# Patient Record
Sex: FEMALE | Race: WHITE | NOT HISPANIC OR LATINO | URBAN - METROPOLITAN AREA
[De-identification: names, ages, dates, MRNs, and addresses within clinical notes are randomized per-mention and may not be internally consistent; named-entity substitution may affect disease eponyms.]

---

## 2020-02-29 ENCOUNTER — EMERGENCY (EMERGENCY)
Facility: HOSPITAL | Age: 75
LOS: 1 days | Discharge: HOME | End: 2020-02-29
Attending: EMERGENCY MEDICINE | Admitting: EMERGENCY MEDICINE
Payer: MEDICARE

## 2020-02-29 VITALS
RESPIRATION RATE: 16 BRPM | SYSTOLIC BLOOD PRESSURE: 115 MMHG | HEART RATE: 82 BPM | HEIGHT: 64 IN | WEIGHT: 182.1 LBS | DIASTOLIC BLOOD PRESSURE: 74 MMHG | TEMPERATURE: 98 F | OXYGEN SATURATION: 99 %

## 2020-02-29 VITALS
TEMPERATURE: 98 F | SYSTOLIC BLOOD PRESSURE: 102 MMHG | OXYGEN SATURATION: 99 % | DIASTOLIC BLOOD PRESSURE: 59 MMHG | RESPIRATION RATE: 16 BRPM | HEART RATE: 57 BPM

## 2020-02-29 LAB
ALBUMIN SERPL ELPH-MCNC: 3.7 G/DL — SIGNIFICANT CHANGE UP (ref 3.3–5)
ALP SERPL-CCNC: 73 U/L — SIGNIFICANT CHANGE UP (ref 40–120)
ALT FLD-CCNC: 37 U/L — SIGNIFICANT CHANGE UP (ref 12–78)
ANION GAP SERPL CALC-SCNC: 9 MMOL/L — SIGNIFICANT CHANGE UP (ref 5–17)
AST SERPL-CCNC: 64 U/L — HIGH (ref 15–37)
BASOPHILS # BLD AUTO: 0.02 K/UL — SIGNIFICANT CHANGE UP (ref 0–0.2)
BASOPHILS NFR BLD AUTO: 0.3 % — SIGNIFICANT CHANGE UP (ref 0–2)
BILIRUB SERPL-MCNC: 0.8 MG/DL — SIGNIFICANT CHANGE UP (ref 0.2–1.2)
BUN SERPL-MCNC: 19 MG/DL — SIGNIFICANT CHANGE UP (ref 7–23)
CALCIUM SERPL-MCNC: 9.8 MG/DL — SIGNIFICANT CHANGE UP (ref 8.5–10.1)
CHLORIDE SERPL-SCNC: 103 MMOL/L — SIGNIFICANT CHANGE UP (ref 96–108)
CO2 SERPL-SCNC: 27 MMOL/L — SIGNIFICANT CHANGE UP (ref 22–31)
CREAT SERPL-MCNC: 0.7 MG/DL — SIGNIFICANT CHANGE UP (ref 0.5–1.3)
EOSINOPHIL # BLD AUTO: 0.2 K/UL — SIGNIFICANT CHANGE UP (ref 0–0.5)
EOSINOPHIL NFR BLD AUTO: 3 % — SIGNIFICANT CHANGE UP (ref 0–6)
GLUCOSE SERPL-MCNC: 97 MG/DL — SIGNIFICANT CHANGE UP (ref 70–99)
HCT VFR BLD CALC: 37 % — SIGNIFICANT CHANGE UP (ref 34.5–45)
HGB BLD-MCNC: 12.5 G/DL — SIGNIFICANT CHANGE UP (ref 11.5–15.5)
IMM GRANULOCYTES NFR BLD AUTO: 0.5 % — SIGNIFICANT CHANGE UP (ref 0–1.5)
LIDOCAIN IGE QN: 165 U/L — SIGNIFICANT CHANGE UP (ref 73–393)
LYMPHOCYTES # BLD AUTO: 1.68 K/UL — SIGNIFICANT CHANGE UP (ref 1–3.3)
LYMPHOCYTES # BLD AUTO: 25.3 % — SIGNIFICANT CHANGE UP (ref 13–44)
MCHC RBC-ENTMCNC: 29.1 PG — SIGNIFICANT CHANGE UP (ref 27–34)
MCHC RBC-ENTMCNC: 33.8 GM/DL — SIGNIFICANT CHANGE UP (ref 32–36)
MCV RBC AUTO: 86.2 FL — SIGNIFICANT CHANGE UP (ref 80–100)
MONOCYTES # BLD AUTO: 0.58 K/UL — SIGNIFICANT CHANGE UP (ref 0–0.9)
MONOCYTES NFR BLD AUTO: 8.7 % — SIGNIFICANT CHANGE UP (ref 2–14)
NEUTROPHILS # BLD AUTO: 4.14 K/UL — SIGNIFICANT CHANGE UP (ref 1.8–7.4)
NEUTROPHILS NFR BLD AUTO: 62.2 % — SIGNIFICANT CHANGE UP (ref 43–77)
NRBC # BLD: 0 /100 WBCS — SIGNIFICANT CHANGE UP (ref 0–0)
PLATELET # BLD AUTO: 233 K/UL — SIGNIFICANT CHANGE UP (ref 150–400)
POTASSIUM SERPL-MCNC: 2.9 MMOL/L — CRITICAL LOW (ref 3.5–5.3)
POTASSIUM SERPL-SCNC: 2.9 MMOL/L — CRITICAL LOW (ref 3.5–5.3)
PROT SERPL-MCNC: 7.3 G/DL — SIGNIFICANT CHANGE UP (ref 6–8.3)
RBC # BLD: 4.29 M/UL — SIGNIFICANT CHANGE UP (ref 3.8–5.2)
RBC # FLD: 13.5 % — SIGNIFICANT CHANGE UP (ref 10.3–14.5)
SODIUM SERPL-SCNC: 139 MMOL/L — SIGNIFICANT CHANGE UP (ref 135–145)
WBC # BLD: 6.65 K/UL — SIGNIFICANT CHANGE UP (ref 3.8–10.5)
WBC # FLD AUTO: 6.65 K/UL — SIGNIFICANT CHANGE UP (ref 3.8–10.5)

## 2020-02-29 PROCEDURE — 74177 CT ABD & PELVIS W/CONTRAST: CPT | Mod: 26

## 2020-02-29 PROCEDURE — 99284 EMERGENCY DEPT VISIT MOD MDM: CPT

## 2020-02-29 PROCEDURE — 36415 COLL VENOUS BLD VENIPUNCTURE: CPT

## 2020-02-29 PROCEDURE — 76705 ECHO EXAM OF ABDOMEN: CPT | Mod: 26

## 2020-02-29 PROCEDURE — 93005 ELECTROCARDIOGRAM TRACING: CPT

## 2020-02-29 PROCEDURE — 80053 COMPREHEN METABOLIC PANEL: CPT

## 2020-02-29 PROCEDURE — 71046 X-RAY EXAM CHEST 2 VIEWS: CPT

## 2020-02-29 PROCEDURE — 76705 ECHO EXAM OF ABDOMEN: CPT

## 2020-02-29 PROCEDURE — 74177 CT ABD & PELVIS W/CONTRAST: CPT

## 2020-02-29 PROCEDURE — 74019 RADEX ABDOMEN 2 VIEWS: CPT

## 2020-02-29 PROCEDURE — 74019 RADEX ABDOMEN 2 VIEWS: CPT | Mod: 26

## 2020-02-29 PROCEDURE — 93010 ELECTROCARDIOGRAM REPORT: CPT

## 2020-02-29 PROCEDURE — 71046 X-RAY EXAM CHEST 2 VIEWS: CPT | Mod: 26

## 2020-02-29 PROCEDURE — 99284 EMERGENCY DEPT VISIT MOD MDM: CPT | Mod: 25

## 2020-02-29 PROCEDURE — 85027 COMPLETE CBC AUTOMATED: CPT

## 2020-02-29 PROCEDURE — 83690 ASSAY OF LIPASE: CPT

## 2020-02-29 RX ORDER — SUCRALFATE 1 G
1 TABLET ORAL
Qty: 56 | Refills: 0
Start: 2020-02-29 | End: 2020-03-13

## 2020-02-29 RX ORDER — IOHEXOL 300 MG/ML
30 INJECTION, SOLUTION INTRAVENOUS ONCE
Refills: 0 | Status: COMPLETED | OUTPATIENT
Start: 2020-02-29 | End: 2020-02-29

## 2020-02-29 RX ORDER — VALSARTAN 80 MG/1
1 TABLET ORAL
Qty: 0 | Refills: 0 | DISCHARGE

## 2020-02-29 RX ORDER — LEVOTHYROXINE SODIUM 125 MCG
1 TABLET ORAL
Qty: 0 | Refills: 0 | DISCHARGE

## 2020-02-29 RX ORDER — ONDANSETRON 8 MG/1
1 TABLET, FILM COATED ORAL
Qty: 30 | Refills: 0
Start: 2020-02-29

## 2020-02-29 RX ORDER — POTASSIUM CHLORIDE 20 MEQ
40 PACKET (EA) ORAL ONCE
Refills: 0 | Status: COMPLETED | OUTPATIENT
Start: 2020-02-29 | End: 2020-02-29

## 2020-02-29 RX ORDER — FAMOTIDINE 10 MG/ML
1 INJECTION INTRAVENOUS
Qty: 30 | Refills: 0
Start: 2020-02-29

## 2020-02-29 RX ADMIN — Medication 40 MILLIEQUIVALENT(S): at 18:22

## 2020-02-29 RX ADMIN — IOHEXOL 30 MILLILITER(S): 300 INJECTION, SOLUTION INTRAVENOUS at 17:15

## 2020-02-29 NOTE — ED PROVIDER NOTE - PATIENT PORTAL LINK FT
Principal Discharge DX:	Foot sprain, right, initial encounter
You can access the FollowMyHealth Patient Portal offered by [facility] by registering at the following website: [email or web address]. By joining [employer]’s FollowMyHealth portal, you will also be able to view your health information using other applications (apps) compatible with our system.

## 2020-02-29 NOTE — ED ADULT NURSE NOTE - OBJECTIVE STATEMENT
74 year old female presents to the ED complaining of abdominal pain. Patient reports pain x 6 months. Patient has seen his PMD and also gastro. Patient was referred for CT but has yet to make an appointment. Patient presents to the ED today for the CT as she is traveling in a week. Patient denies increase to pain today. Patient admits to constipation. Denies urinary symptoms. Patient denies nausea/vomiting. 74 year old female presents to the ED complaining of abdominal pain. Patient reports pain x 6 months. Patient has seen her PMD and also gastro. Patient was referred for CT but has yet to make an appointment. Patient presents to the ED today for the CT as she is traveling in a week. Patient denies increase to pain today. Patient admits to constipation. Patients states no bowel movement in 12 days. Denies urinary symptoms. Patient denies nausea/vomiting. Patient denies fever/chills.

## 2020-02-29 NOTE — ED PROVIDER NOTE - NSPTACCESSSVCSAPPTDETAILS_ED_ALL_ED_FT
Please make sure you follow up with the Gastroenterologist after your trip for an endoscopy and colonoscopy.

## 2020-02-29 NOTE — ED PROVIDER NOTE - CARE PLAN
Principal Discharge DX:	Epigastric pain  Secondary Diagnosis:	Liver metastases  Secondary Diagnosis:	Hypokalemia

## 2020-02-29 NOTE — ED PROVIDER NOTE - OBJECTIVE STATEMENT
73 y/o F with c/o epigastric pain worse with eating x 6 months.  pt states she is going on a cruise in 7 days and needs to know what is wrong.  Pt was seen by GI and given a prescription for a CT and blood work but pt does not have time.  Denies nausea, vomiting, fevers, urinary symptoms,  Decreased bowel movements x 12 days.

## 2020-02-29 NOTE — ED PROVIDER NOTE - PROGRESS NOTE DETAILS
Discussed results with patient and daughter who are aware of liver, adrenal, lung lesions concerning for metastasis.  Pt wants to follow up outpt in the city

## 2020-02-29 NOTE — ED PROVIDER NOTE - CLINICAL SUMMARY MEDICAL DECISION MAKING FREE TEXT BOX
epigastric pain x 6 mos worse with PO intake.  US, CT r/o obstruction, gastritis, PUD, pancreatitis, choly

## 2020-02-29 NOTE — ED ADULT NURSE NOTE - CHPI ED NUR SYMPTOMS NEG
no fever/no abdominal distension/no diarrhea/no nausea/no dysuria/no hematuria/no blood in stool/no burning urination/no chills/no vomiting

## 2020-02-29 NOTE — ED ADULT NURSE REASSESSMENT NOTE - NS ED NURSE REASSESS COMMENT FT1
Patient instructed to prep with PO Contrast for CT Abdomen. Patient began drinking at 1715. Plan for CT at 1915. Safety maintained.

## 2020-03-04 PROBLEM — I10 ESSENTIAL (PRIMARY) HYPERTENSION: Chronic | Status: ACTIVE | Noted: 2020-02-29

## 2020-03-04 PROBLEM — E03.9 HYPOTHYROIDISM, UNSPECIFIED: Chronic | Status: ACTIVE | Noted: 2020-02-29

## 2020-03-11 ENCOUNTER — OUTPATIENT (OUTPATIENT)
Dept: OUTPATIENT SERVICES | Facility: HOSPITAL | Age: 75
LOS: 1 days | End: 2020-03-11
Payer: MEDICARE

## 2020-03-11 DIAGNOSIS — R10.9 UNSPECIFIED ABDOMINAL PAIN: ICD-10-CM

## 2020-03-11 DIAGNOSIS — R93.89 ABNORMAL FINDINGS ON DIAGNOSTIC IMAGING OF OTHER SPECIFIED BODY STRUCTURES: ICD-10-CM

## 2020-03-11 PROCEDURE — 88312 SPECIAL STAINS GROUP 1: CPT

## 2020-03-11 PROCEDURE — 88360 TUMOR IMMUNOHISTOCHEM/MANUAL: CPT | Mod: 26

## 2020-03-11 PROCEDURE — 43239 EGD BIOPSY SINGLE/MULTIPLE: CPT

## 2020-03-11 PROCEDURE — 88360 TUMOR IMMUNOHISTOCHEM/MANUAL: CPT

## 2020-03-11 PROCEDURE — 88305 TISSUE EXAM BY PATHOLOGIST: CPT | Mod: 26

## 2020-03-11 PROCEDURE — 88305 TISSUE EXAM BY PATHOLOGIST: CPT

## 2020-03-11 PROCEDURE — 88312 SPECIAL STAINS GROUP 1: CPT | Mod: 26

## 2020-03-11 PROCEDURE — 45378 DIAGNOSTIC COLONOSCOPY: CPT

## 2020-03-12 LAB — SURGICAL PATHOLOGY STUDY: SIGNIFICANT CHANGE UP

## 2022-04-15 NOTE — ED PROVIDER NOTE - CONSTITUTIONAL, MLM
Recent Review Flowsheet Data     Date 4/15/2022    Adult PHQ 2 Score 0    Adult PHQ 2 Interpretation No further screening needed    Little interest or pleasure in activity? Not at all    Feeling down, depressed or hopeless? Not at all         Well appearing, awake, alert, oriented to person, place, time/situation and in no apparent distress. normal...

## 2022-09-29 NOTE — ED PROVIDER NOTE - TEMPLATE
Prep Survey    Flowsheet Row Responses   [facility] patient discharged from? [place]   Is LACE score < 7 ? No   Emergency Room discharge w/ pulse ox? No   Eligibility Readm Mgmt   Discharge diagnosis Acute deep vein thrombosis    Does the patient have one of the following disease processes/diagnoses(primary or secondary)? Other   Does the patient have Home health ordered? Yes   What is the Home health agency?  [facility]   Is there a DME ordered? No   Prep survey completed? Yes          AYAZ LEIJA - Registered Nurse         Abdominal Pain, N/V/D

## 2025-05-29 NOTE — ED PROVIDER NOTE - SECONDARY DIAGNOSIS.
The patient has been examined and the H&P has been reviewed:    Pre-Procedure H and P Addendum    Patient seen and examined.  History and exam unchanged from prior history and physical.      Procedure: Device-assisted colonoscopy   Indication: GI bleeding   ASA Class: per anesthesiology  Airway: normal  Neck Mobility: full range of motion  Mallampatti score: per anesthesia  History of anesthesia problems: no  Family history of anesthesia problems: no  Anesthesia Plan: MAC        Active Hospital Problems    Diagnosis  POA    *Acute GI bleeding [K92.2]  Yes     Chronic    Acute cystitis without hematuria [N30.00]  Yes     Urine culture with GNR, sensitivity and susceptibility pending   WBC peaked at 14, down trending   Afebrile      Hypomagnesemia [E83.42]  Yes     Chronic    Body mass index (BMI) 19.9 or less, adult [Z68.1]  Yes    Abrasion [T14.8XXA]  Yes     Chronic    (HFpEF) heart failure with preserved ejection fraction [I50.30]  Yes     Chronic    Pulmonary emphysema [J43.9]  Yes     Chronic    Iron deficiency anemia secondary to blood loss (chronic) [D50.0]  Yes     Chronic    Hypertension [I10]  Yes     Chronic    Hypokalemia [E87.6]  Yes     Chronic    Gastroesophageal reflux disease without esophagitis [K21.9]  Yes     Chronic    Coronary artery disease involving native coronary artery of native heart without angina pectoris [I25.10]  Yes     Chronic     Outside [facility] [place] 2014:  mLAD 20%  mCx 50%  mRCA 20%        Resolved Hospital Problems   No resolved problems to display.      Liver metastases Hypokalemia